# Patient Record
Sex: MALE | Race: WHITE | URBAN - METROPOLITAN AREA
[De-identification: names, ages, dates, MRNs, and addresses within clinical notes are randomized per-mention and may not be internally consistent; named-entity substitution may affect disease eponyms.]

---

## 2022-11-23 ENCOUNTER — OFFICE VISIT (OUTPATIENT)
Dept: ORTHOPEDIC SURGERY | Age: 57
End: 2022-11-23
Payer: OTHER GOVERNMENT

## 2022-11-23 VITALS — BODY MASS INDEX: 36.4 KG/M2 | WEIGHT: 260 LBS | HEIGHT: 71 IN

## 2022-11-23 DIAGNOSIS — S46.011A TRAUMATIC TEAR OF RIGHT ROTATOR CUFF, UNSPECIFIED TEAR EXTENT, INITIAL ENCOUNTER: Primary | ICD-10-CM

## 2022-11-23 PROCEDURE — 99203 OFFICE O/P NEW LOW 30 MIN: CPT | Performed by: ORTHOPAEDIC SURGERY

## 2022-11-23 RX ORDER — TADALAFIL 10 MG/1
10 TABLET ORAL
COMMUNITY
Start: 2022-10-04

## 2022-11-23 RX ORDER — TRAMADOL HYDROCHLORIDE 50 MG/1
50 TABLET ORAL NIGHTLY
Qty: 15 TABLET | Refills: 0 | Status: SHIPPED | OUTPATIENT
Start: 2022-11-23 | End: 2022-12-08

## 2022-11-23 RX ORDER — MOXIFLOXACIN 5 MG/ML
SOLUTION/ DROPS OPHTHALMIC
COMMUNITY
Start: 2022-03-03

## 2022-11-23 RX ORDER — PREDNISOLONE ACETATE 10 MG/ML
SUSPENSION/ DROPS OPHTHALMIC
COMMUNITY
Start: 2022-03-03

## 2022-11-23 RX ORDER — LORATADINE 10 MG/1
10 TABLET ORAL
COMMUNITY
Start: 2022-07-22

## 2022-11-23 RX ORDER — LEVOTHYROXINE SODIUM 0.15 MG/1
150 TABLET ORAL
COMMUNITY
Start: 2022-03-08

## 2022-11-23 RX ORDER — FLUTICASONE PROPIONATE 50 MCG
SPRAY, SUSPENSION (ML) NASAL
COMMUNITY
Start: 2022-06-02

## 2022-11-23 RX ORDER — INSULIN GLARGINE 100 [IU]/ML
INJECTION, SOLUTION SUBCUTANEOUS
COMMUNITY
Start: 2022-02-10

## 2022-11-23 RX ORDER — ATORVASTATIN CALCIUM 80 MG/1
80 TABLET, FILM COATED ORAL
COMMUNITY
Start: 2022-07-22

## 2022-11-23 RX ORDER — ASPIRIN 81 MG/1
81 TABLET ORAL
COMMUNITY
Start: 2022-10-04

## 2022-11-23 RX ORDER — KETOROLAC TROMETHAMINE 5 MG/ML
SOLUTION OPHTHALMIC
COMMUNITY
Start: 2022-03-03

## 2022-11-23 NOTE — PROGRESS NOTES
[facility]  History and Physical  Shoulder Pain    Date:  2022    Name:  Dagmar Salgado  Address:  [address] [address]    :  1965      Age:   59 y.o.    SSN:  [id number]      Medical Record Number:  [id number]    Reason for Visit:    Shoulder Pain (NP RIGHT SHOULDER)      HPI:   Dagmar Salgado is a 59 y.o. male who presents to our office today for evaluation right shoulder pain. The patient sustained a fall approximately 5 weeks ago where he landed on the right shoulder. He has had continued pain since the injury though he admits to slight improvements. The pain is worse with activity and lifting objects overhead. She has taken over-the-counter anti-inflammatories for pain control with mild to minimal relief. Pain Assessment  Location of Pain: Shoulder  Location Modifiers: Right  Severity of Pain: 4  Quality of Pain: Aching, Dull, Sharp  Duration of Pain: Persistent  Frequency of Pain: Constant  Aggravating Factors: Other (Comment), Exercise, Straightening, Stretching  Limiting Behavior: Yes  Relieving Factors: Rest, Other (Comment), Ice  Work-Related Injury: No  Are there other pain locations you wish to document?: No    Review of Systems:  A 14 point review of systems available in the scanned medical record as documented by the patient. The review is negative with the exception of those things mentioned in the History of Present Illness and Past Medical History. Past History:  No past medical history on file. No past surgical history on file.   Current Outpatient Medications on File Prior to Visit   Medication Sig Dispense Refill    tadalafil (CIALIS) 10 MG tablet 10 mg      prednisoLONE acetate (PRED FORTE) 1 % ophthalmic suspension INSTILL 1 DROP TO AFFECTED EYE(S) THREE TIMES A DAY IN SURGERY EYE AFTER SURGERY AND TAPER AS DIRECTED      moxifloxacin (VIGAMOX) 0.5 % ophthalmic solution INSTILL 1 DROP TO AFFECTED EYE(S) THREE TIMES A DAY INTO OPERATIVE EYE 1 DAY BEFORE SURGERY AND 1 DROP THE MORNING OF SURGERY. CONTINUE THREE TIMES A DAY FOR 1 WEEK.      metFORMIN (GLUCOPHAGE) 1000 MG tablet 1,000 mg      loratadine (CLARITIN) 10 MG tablet 10 mg      levothyroxine (SYNTHROID) 150 MCG tablet 150 mcg      ketorolac (ACULAR) 0.5 % ophthalmic solution INSTILL 1 DROP TO AFFECTED EYE(S) ONCE DAILY IN OPERATIVE EYE THE DAY BEFORE SURGERY AND MORNING OF, TAPER AS DIRECTED FOR 4 WEEKS      insulin glargine (LANTUS) 100 UNIT/ML injection vial INJECT 70 UNITS UNDER THE SKIN AT BEDTIME * DO NOT MIX IN SAME SYRINGE WITH OTHER INSULINS * (DISCARD 28 DAYS AFTER OPENING VIAL)      fluticasone (FLONASE) 50 MCG/ACT nasal spray USE 2 SPRAYS IN EACH NOSTRIL ONCE DAILY FOR NASAL ALLERGY **NOTE NEW DOSE/DIRECTIONS**      empagliflozin (JARDIANCE) 25 MG tablet 12.5 mg      dulaglutide (TRULICITY) 1.5 CQ/8.4CO SC injection INJECT 1.5MG (0.5ML) UNDER THE SKIN EVERY WEEK FOR BLOOD SUGAR      atorvastatin (LIPITOR) 80 MG tablet 80 mg      aspirin 81 MG EC tablet 81 mg       No current facility-administered medications on file prior to visit.      Social History     Socioeconomic History    Marital status:      Spouse name: Not on file    Number of children: Not on file    Years of education: Not on file    Highest education level: Not on file   Occupational History    Not on file   Tobacco Use    Smoking status: Never    Smokeless tobacco: Never   Substance and Sexual Activity    Alcohol use: Not Currently    Drug use: Never    Sexual activity: Not on file   Other Topics Concern    Not on file   Social History Narrative    Not on file     Social Determinants of Health     Financial Resource Strain: Not on file   Food Insecurity: Not on file   Transportation Needs: Not on file   Physical Activity: Not on file   Stress: Not on file   Social Connections: Not on file   Intimate Partner Violence: Not on file   Housing Stability: Not on file     No family history on file. Current Medications:    Current Outpatient Medications   Medication Sig Dispense Refill    tadalafil (CIALIS) 10 MG tablet 10 mg      prednisoLONE acetate (PRED FORTE) 1 % ophthalmic suspension INSTILL 1 DROP TO AFFECTED EYE(S) THREE TIMES A DAY IN SURGERY EYE AFTER SURGERY AND TAPER AS DIRECTED      moxifloxacin (VIGAMOX) 0.5 % ophthalmic solution INSTILL 1 DROP TO AFFECTED EYE(S) THREE TIMES A DAY INTO OPERATIVE EYE 1 DAY BEFORE SURGERY AND 1 DROP THE MORNING OF SURGERY. CONTINUE THREE TIMES A DAY FOR 1 WEEK.      metFORMIN (GLUCOPHAGE) 1000 MG tablet 1,000 mg      loratadine (CLARITIN) 10 MG tablet 10 mg      levothyroxine (SYNTHROID) 150 MCG tablet 150 mcg      ketorolac (ACULAR) 0.5 % ophthalmic solution INSTILL 1 DROP TO AFFECTED EYE(S) ONCE DAILY IN OPERATIVE EYE THE DAY BEFORE SURGERY AND MORNING OF, TAPER AS DIRECTED FOR 4 WEEKS      insulin glargine (LANTUS) 100 UNIT/ML injection vial INJECT 70 UNITS UNDER THE SKIN AT BEDTIME * DO NOT MIX IN SAME SYRINGE WITH OTHER INSULINS * (DISCARD 28 DAYS AFTER OPENING VIAL)      fluticasone (FLONASE) 50 MCG/ACT nasal spray USE 2 SPRAYS IN EACH NOSTRIL ONCE DAILY FOR NASAL ALLERGY **NOTE NEW DOSE/DIRECTIONS**      empagliflozin (JARDIANCE) 25 MG tablet 12.5 mg      dulaglutide (TRULICITY) 1.5 XR/8.0JD SC injection INJECT 1.5MG (0.5ML) UNDER THE SKIN EVERY WEEK FOR BLOOD SUGAR      atorvastatin (LIPITOR) 80 MG tablet 80 mg      aspirin 81 MG EC tablet 81 mg       No current facility-administered medications for this visit. Allergies:  No Known Allergies    Physical Exam:  There were no vitals filed for this visit. General: Gabe Ambriz is a healthy and well appearing 59 y.o. male who is sitting comfortably in our office in acute distress. General Exam:   Constitutional: Patient is adequately groomed with no evidence of malnutrition    Neuro: alert.  Oriented X 3  Eyes: Extra-ocular muscles intact  Mouth: Oral mucosa moist. No perioral lesions  Pulm: Respirations unlabored and regular. Right shoulder Exam:  Inspection: No gross deformities, no signs of infection. Palpation:  There is mild subacromial crepitus. Active Range of Motion: Forward elevation of 90, abduction of 90, external rotation with elbow at the side 45, internal rotation to the back pocket    Strength:   External rotation with resistance with elbow at the side 4/5, internal rotation with resistance with elbow at the side 4/5, Champagne toast testing 3/5, Jobes test 3/5    Special Tests: Negative bearhug    Neurovascular: Sensation to light touch is intact, no motor deficits, palpable radial pulses 2+    Laboratory:  No visits with results within 14 Day(s) from this visit. Latest known visit with results is:   No results found for any previous visit. No results found for this or any previous visit (from the past 24 hour(s)). Radiographic:  3 xray views of the right shoulder including True AP in internal and external and axillary lateral were taken in our office today reveal no fractures, dislocations, visible tumors, or signs of acute trauma. Radiographic Impression: Unremarkable right shoulder x-rays      Self assessment questionnaires including ASES and Simple Shoulder Test were completed today. Assessment:  Johan Viveros is a 59 y.o. male suspected right shoulder rotator cuff tear and concomitant developing adhesive capsulitis    Impression:  Encounter Diagnosis   Name Primary? Traumatic tear of right rotator cuff, unspecified tear extent, initial encounter Yes       Office Procedures:  Orders Placed This Encounter   Procedures    MRI SHOULDER RIGHT WO CONTRAST     Standing Status:   Future     Standing Expiration Date:   11/23/2023     Order Specific Question:   Reason for exam:     Answer:   Proscan gali       Plan:      We discussed  with Johan Viveros the we suspect a right shoulder rotator cuff tear and adhesive capsulitis.  Based on his history and clinical exam we will obtain an MRI of his right shoulder to evaluate rotator cuff pathology. Ultram will be provided for symptomatic relief at nighttime only. We will see him back after MRI results obtained. Be Becerra will follow up after MRI results obtained. He was in agreement with this plan and all questions were answered to the patient's satisfaction. He was encouraged to call with any questions. 11/23/2022  5:40 PM      Ranulfo Henriquez D.O. Orthopedic Surgeon, [facility] Fellow    The encounter with the patient was supervised by Dr. Lorrin Bamberger who personally examined the patient and reviewed the plan. This dictation was performed with a verbal recognition program (DRAGON) and it was checked for errors. It is possible that there are still dictated errors within this office note. If so, please bring any errors to my attention for an addendum. All efforts were made to ensure that this office note is accurate.  _____________________  I was physically present and personally supervised the Orthopaedic Sports Medicine Fellow in the evaluation and development of a treatment plan for this patient. I personally interviewed the patient and performed a physical examination. In addition, I discussed the patient's condition and treatment options with them. I have also reviewed and agree with the past medical, family and social history unless otherwise noted. All of the patient's questions were answered. Samer S. Lorrin Bamberger, MD, PhD  11/23/2022

## 2022-11-28 ENCOUNTER — TELEPHONE (OUTPATIENT)
Dept: ORTHOPEDIC SURGERY | Age: 57
End: 2022-11-28

## 2022-11-28 DIAGNOSIS — S46.011A TRAUMATIC TEAR OF RIGHT ROTATOR CUFF, UNSPECIFIED TEAR EXTENT, INITIAL ENCOUNTER: Primary | ICD-10-CM

## 2022-11-28 RX ORDER — TRAMADOL HYDROCHLORIDE 50 MG/1
50 TABLET ORAL EVERY 6 HOURS PRN
Qty: 28 TABLET | Refills: 0 | Status: CANCELLED | OUTPATIENT
Start: 2022-11-28 | End: 2022-12-05

## 2022-11-28 NOTE — TELEPHONE ENCOUNTER
[facility] NEED[facility] RX FOR TRAMADOL. CAN NOT HAVE ANY RESIDENT INFORMATION NEEDS TO BE UNDER DR Nevaeh Wilhelm. 2ND REQUEST.  PLEASE CALL [phone] EXT [phone]

## 2022-11-28 NOTE — TELEPHONE ENCOUNTER
Patient was given phone # for aretha Olivares. He has a f/u appointment on 12/8/22 to review results.

## 2022-11-28 NOTE — TELEPHONE ENCOUNTER
Other PATIENT IS CALLING REQUESTING A CALL BACK. PATIENT IS READY TO SCHEDULE MRI AND SAYS [facility] WILL COVER ANY LOCATION.  Manuel  [phone]

## 2022-11-28 NOTE — TELEPHONE ENCOUNTER
General Question     Subject: PT STATES [facility] NEEDS AN ORDER SENT TO THEM.   [facility] and /or [facility]  Contact Number: [phone]

## 2022-11-29 DIAGNOSIS — S46.011A TRAUMATIC TEAR OF RIGHT ROTATOR CUFF, UNSPECIFIED TEAR EXTENT, INITIAL ENCOUNTER: Primary | ICD-10-CM

## 2022-11-29 RX ORDER — TRAMADOL HYDROCHLORIDE 50 MG/1
50 TABLET ORAL EVERY 6 HOURS PRN
Qty: 28 TABLET | Refills: 0 | Status: SHIPPED | OUTPATIENT
Start: 2022-11-29 | End: 2022-12-06

## 2022-12-05 ENCOUNTER — TELEPHONE (OUTPATIENT)
Dept: ORTHOPEDIC SURGERY | Age: 57
End: 2022-12-05

## 2022-12-08 ENCOUNTER — OFFICE VISIT (OUTPATIENT)
Dept: ORTHOPEDIC SURGERY | Age: 57
End: 2022-12-08

## 2022-12-08 VITALS — WEIGHT: 260 LBS | HEIGHT: 71 IN | BODY MASS INDEX: 36.4 KG/M2

## 2022-12-08 DIAGNOSIS — S46.011D TRAUMATIC COMPLETE TEAR OF RIGHT ROTATOR CUFF, SUBSEQUENT ENCOUNTER: Primary | ICD-10-CM

## 2022-12-08 RX ORDER — MELOXICAM 15 MG/1
15 TABLET ORAL DAILY PRN
Qty: 30 TABLET | Refills: 5 | Status: SHIPPED | OUTPATIENT
Start: 2022-12-08

## 2022-12-08 RX ORDER — METHYLPREDNISOLONE ACETATE 40 MG/ML
80 INJECTION, SUSPENSION INTRA-ARTICULAR; INTRALESIONAL; INTRAMUSCULAR; SOFT TISSUE ONCE
Status: SHIPPED | OUTPATIENT
Start: 2022-12-08

## 2022-12-08 NOTE — LETTER
Shoulder Elbow Rehabilitation Referral    Patient Name: Renetta Paniagua      YOB: 1965    Diagnosis:   1. Traumatic complete tear of right rotator cuff, subsequent encounter    Massive two tendon rotator cuff tear     Precautions: CSI 12/8/2022     Post Op Instructions:  [] Continuous passive motion (CPM)  [] Elbow range of motion  [x] Exercise in plane of scapula   []  Strengthening     [] Pulley and instruction    [x] Home exercise program (copy to patient)   [] Sling when arm at risk  [] Sling or brace at all times   [] AAROM: Forward elevation to             [] AAROM: External rotation to     [] Isometric external rotator strengthening [] AAROM: internal rotation: up the back  [] Isometric abductor strengthening  [] AAROM: Internal abduction     [] Isometric internal rotator strengthening [] AAROM: cross-body adduction             Stretching:     Strengthening:  [x] Four quadrant (FE, ER, IR, CBA)  [x] Rotator cuff (ER, IR, Abd)  [] Forward Elevation    [] External Rotators     [] External Rotation    [] Internal Rotators  [] Internal Rotation: up/back   [] Abductors     [] Internal Rotation: supine in abduction  [] Flexors  [] Cross-body abduction    [] Extensors  [] Pendulum (FE, Abd/Add, cw/ccw)  [x] Scapular Stabilizers   [] Wall-walking (FE, Abd)    [x] Shoulder shrugs     [] Table slides      [x] Rhomboid pinch  [] Elbow (flex, ext, pron, sup)    [] Lat.  Pull downs     [] Medial epicondylitis program    [] Forward punch   [] Lateral epicondylitis program    [] Internal rotators     [x] Progressive resistive exercises  [] Bench Press        [] Bench press plus  Activities:     [] Lateral pull-downs  [x] Rowing     [x] Progressive two-hand supine press  [] Stepper/Exercise bike   [x] Biceps: curls/supination  [] Swimming  [] Water exercises    Modalities: PRN    Return to Sport:  [] Ultrasound     [] Plyometrics  [] Iontophoresis     [] Rhythmic stabilization  [] Moist heat     [] Core strengthening   [] Massage     [] Sports specific program:   [x] Cryotherapy      [] Electrical stimulation     [] Paraffin  [] Whirlpool  [] TENS    [x] Home exercise program (copy to patient). Perform exercises for:   15     minutes    2-3      times/day  [x] Supervised physical therapy  Frequency: []  1x week  [x] 2x week  [] 3x week  [] Other:   Duration: [] 2 weeks   [] 4 weeks  [x] 6 weeks  [] Other:     Additional Instructions:           Samer S. Lorrin Bamberger, MD, PhD

## 2022-12-08 NOTE — PROGRESS NOTES
Chief Complaint    Shoulder Pain (F/U RIGHT SHOULDER)      History of Present Illness:  Mariela Julian is a pleasant, 59 y.o., male, here today for follow up of his right shoulder. To recap, the patient sustained a fall approximately 6 weeks ago where he landed on the right shoulder. He has had continued pain since the injury though he admits to slight improvements. The pain is worse with activity and lifting objects overhead. He has taken over-the-counter anti-inflammatories for pain control with mild to minimal relief. Prior to this injury he was having no problems with this shoulder. His history and physical exam were concerning for a sizeable rotator cuff tear. We did order an MRI to evaluate the rotator cuff. He tolerated the study well and presents today to review those results. He reports no new injuries or setbacks. Pain Assessment  Location of Pain: Shoulder  Location Modifiers: Right  Severity of Pain: 4  Quality of Pain: Sharp, Dull, Aching  Duration of Pain: Persistent  Frequency of Pain: Constant  Aggravating Factors: Other (Comment), Straightening, Exercise, Stretching  Limiting Behavior: Yes  Relieving Factors: Rest, Other (Comment)  Work-Related Injury: No  Are there other pain locations you wish to document?: No      Medical History:  Patient's medications, allergies, past medical, surgical, social and family histories were reviewed and updated as appropriate. No notes on file    Review of Systems  A 14 point review of systems was completed by the patient and is available in the media section of the scanned medical record and was reviewed on 12/8/2022. The review is negative with the exception of those things mentioned in the HPI and Past Medical History    Vital Signs: There were no vitals filed for this visit. General/Appearance: Alert and oriented and in no apparent distress. Skin:  There are no skin lesions, cellulitis, or extreme edema.  The patient has warm and well-perfused Bilateral upper extremities with brisk capillary refill. Right Shoulder Exam:  Inspection: No gross deformities, no signs of infection. Palpation: Tenderness over the rotator cuff footprint    Active Range of Motion: Forward Elevation 90 self limited, External Rotation 45    Passive Range of Motion: Forward Elevation 150 with pain on arm descent    Strength:  External Rotation 4/5, Internal Rotation 4/5    Special Tests: Negative drop arm, Negative ER lag, Negative belly press, Positive bear hug with 3/5 strength, No Master muscle deformity. Neurovascular: Sensation to light touch is intact, no motor deficits, palpable radial pulses 2+      Radiology:     No new XR obtained at this time. MRI Right shoulder: 12/7/22  CONCLUSION:   1. Massive rotator cuff tear with complete supraspinatus and near complete infraspinatus tears    with few posterior infraspinatus tendon fibers intact. Medial retraction up to 4cm. 2. Moderate supraspinatus, infraspinatus and teres minor muscle fatty atrophy with grade 1    strains of the supraspinatus and infraspinatus. 3. Proximal biceps tendon rupture with distal retraction. 4. Grade 1 AC joint injury superimposed on moderate AC joint osteoarthritis. Correlate with    radiograph to evaluate for distal clavicle fracture. 5. High-grade IGHL sprain. 6. Small joint effusion with bodies in the subcoracoid recess, presumably blood. Assessment :  Mr. Pito Mustafa is a pleasant, 59 y.o. patient who unfortunately does have a massive two tendon rotator cuff tear as demonstrated on MRI. He has several options moving forward. Impression:  Encounter Diagnosis   Name Primary?     Traumatic complete tear of right rotator cuff, subsequent encounter Yes       Office Procedures:  Orders Placed This Encounter   Procedures    Amb External Referral To Physical Therapy     Referral Priority:   Routine     Referral Type:   Consult for Advice and Opinion Referral Reason:   Patient Preference     Requested Specialty:   Orthopedic Physical Therapist     Number of Visits Requested:   1    [id number] - MA DRAIN/INJECT LARGE JOINT/BURSA       After discussing the risks and benefits of a corticosteroid injection, Caroline Martines did state an understanding and gave verbal consent to proceed. After cleansing the injection site with Chlora-prep and using aseptic techniques,  2 CC of Depo Medrol 40mg/ml and 8 CC of 1% lidocaine were injected in the right subacromial space  He  tolerated the procedure well with no immediate adverse sequelae after the injection. A bandage was placed over the injection site. Appropriate post injections instructions were given to the patient. Treatment Plan: We had a candid discussion with the patient today regarding his shoulder. We reviewed pertinent imaging today with the patient. We discussed diagnosis and treatment options in detail. Unfortunately the MRI demonstrates a massive two tendon cuff tear. Given the atrophy and retraction of the tendons on MRI we are not confident this will be reparable and we do not feel he is a good candidate for an arthroscopic repair. There is no atrophy of the subscapularis tendon. We discussed conservative vs surgical treatment options in detail. Surgically he may be a candidate for an SCR. We discussed in detail risks, benefits and expectations postoperatively. We also discussed the lengthy recovery associated with this operation. Given his physically demanding job and the time he would need to remain off from work, we do not feel he is a candidate for this operation at this time. Conservatively we have the option to proceed with a steroid injection today to manage his pain and couple this with formal physical therapy to work on both motion and strength. We explained this would not address the structural problem but may buy some time before he will require an operation.  Today he would like to proceed with conservative treatment. We did go ahead and proceed with an injection today. Detailed therapy orders were placed in his chart. He would like to do this at [facility] in [place], [place]. We would also like for him to begin an oral anti-inflammatory. We will call Meloxicam in to his pharmacy. He is agreeable to this plan. We will see Eun Herrera back in 6-8 weeks and/or as needed. All questions were answered to patient's satisfaction and He was encouraged to call with any further questions or concerns. Xavier Kenyon is in agreement with this plan. Risks, benefits and potential complications of arthroscopic shoulder surgery were discussed with the patient. Risks discussed include but are not limited to bleeding, infection, anesthetic risk, injury to nerves and blood vessels, deep vein thrombosis, residual stiffness and weakness, and the need for revision surgery. The patient also understands that anesthetic risks include cardiopulmonary issues, drug reactions and even death. The patient voices an understanding of the importance of physical therapy and home exercises after surgery. All questions were answered and written informed consent for surgery was obtained today. Greater than 25 minutes were expended on various aspects of today's visit. 12/8/2022  5:48 PM    Jaspal Hedrick ATC  [address] [facility]    During this examination, ITomi, functioned as a scribe for Dr. Juni Love. The history taking and physical examination were performed by Dr. Merna Arciniega. All counseling during the appointment was performed between the patient and Dr. Merna Arciniega. 12/8/22  ______________  I, Dr. Juni Love, personally performed the services described in this documentation as described by Jaspal Hedrick ATC in my presence, and it is both accurate and complete. Evelio Arciniega MD, PhD  12/8/2022

## 2022-12-12 RX ORDER — METHYLPREDNISOLONE ACETATE 40 MG/ML
80 INJECTION, SUSPENSION INTRA-ARTICULAR; INTRALESIONAL; INTRAMUSCULAR; SOFT TISSUE ONCE
Status: COMPLETED | OUTPATIENT
Start: 2022-12-12 | End: 2022-12-12

## 2022-12-12 RX ADMIN — METHYLPREDNISOLONE ACETATE 80 MG: 40 INJECTION, SUSPENSION INTRA-ARTICULAR; INTRALESIONAL; INTRAMUSCULAR; SOFT TISSUE at 15:13

## 2023-01-05 ENCOUNTER — TELEPHONE (OUTPATIENT)
Dept: ORTHOPEDIC SURGERY | Age: 58
End: 2023-01-05

## 2023-01-05 NOTE — TELEPHONE ENCOUNTER
General Question     Subject: PATIENT REQUESTING A CALL REGARDING TO HOW LONG HE SHOULD DO PT.   Patient: Ileana Godoy Number: [phone]

## 2023-01-26 ENCOUNTER — OFFICE VISIT (OUTPATIENT)
Dept: ORTHOPEDIC SURGERY | Age: 58
End: 2023-01-26

## 2023-01-26 VITALS — WEIGHT: 260 LBS | BODY MASS INDEX: 36.4 KG/M2 | HEIGHT: 71 IN

## 2023-01-26 DIAGNOSIS — M25.511 RIGHT SHOULDER PAIN, UNSPECIFIED CHRONICITY: ICD-10-CM

## 2023-01-26 DIAGNOSIS — S46.011A TRAUMATIC TEAR OF RIGHT ROTATOR CUFF, UNSPECIFIED TEAR EXTENT, INITIAL ENCOUNTER: ICD-10-CM

## 2023-01-26 DIAGNOSIS — S46.011D TRAUMATIC COMPLETE TEAR OF RIGHT ROTATOR CUFF, SUBSEQUENT ENCOUNTER: Primary | ICD-10-CM

## 2023-01-26 NOTE — PROGRESS NOTES
Chief Complaint    Shoulder Pain (F/U RIGHT SHOULDER)      History of Present Illness:  Jeff Coppola is a pleasant, 59 y.o., male, here today for follow up of his right shoulder. The patient has a history of unfortunately sustaining a fall in late October, 2022 onto the right shoulder. His MRI showed a massive irreparable rotator cuff tear. He was given a corticosteroid injection and he felt that it helped for a short period of time. He has had continued pain and has weaknessfd since the injury though he admits to slight improvements. The pain is worse with activity and lifting objects overhead. He reports no new injuries or setbacks. Pain Assessment  Location of Pain: Shoulder  Location Modifiers: Right  Severity of Pain: 5  Quality of Pain: Aching, Sharp  Duration of Pain: Persistent  Frequency of Pain: Constant  Aggravating Factors: Other (Comment), Straightening, Stretching, Exercise  Limiting Behavior: Yes  Relieving Factors: Rest, Other (Comment), Ice  Work-Related Injury: No  Are there other pain locations you wish to document?: No      Medical History:  Patient's medications, allergies, past medical, surgical, social and family histories were reviewed and updated as appropriate. Review of Systems  A 14 point review of systems was completed by the patient and is available in the media section of the scanned medical record and was reviewed on 1/26/2023. The review is negative with the exception of those things mentioned in the HPI and Past Medical History    Vital Signs: There were no vitals filed for this visit. General/Appearance: Alert and oriented and in no apparent distress. Skin:  There are no skin lesions, cellulitis, or extreme edema. The patient has warm and well-perfused Bilateral upper extremities with brisk capillary refill. Right Shoulder Exam:  Inspection: No gross deformities, no signs of infection.     Palpation: Tenderness over the rotator cuff footprint    Active Range of Motion: Forward Elevation 90 self limited due to pain but can go to 130, Abduction to 120, External Rotation 45    Passive Range of Motion: Forward Elevation 140 with painful arch and pain on arm descent    Strength:  External Rotation 4/5, Internal Rotation 4/5    Special Tests: Negative drop arm, Negative ER lag, Negative belly press, Positive bear hug with 3/5 strength, No Master muscle deformity. Neurovascular: Sensation to light touch is intact, no motor deficits, palpable radial pulses 2+      Radiology:     No new XR obtained at this time. MRI Right shoulder: 12/7/22  CONCLUSION:   1. Massive rotator cuff tear with complete supraspinatus and near complete infraspinatus tears    with few posterior infraspinatus tendon fibers intact. Medial retraction up to 4cm. 2. Moderate supraspinatus, infraspinatus and teres minor muscle fatty atrophy with grade 1    strains of the supraspinatus and infraspinatus. 3. Proximal biceps tendon rupture with distal retraction. 4. Grade 1 AC joint injury superimposed on moderate AC joint osteoarthritis. Correlate with    radiograph to evaluate for distal clavicle fracture. 5. High-grade IGHL sprain. 6. Small joint effusion with bodies in the subcoracoid recess, presumably blood. Assessment :  Mr. Dominga Ugarte is a pleasant, 59 y.o. patient who unfortunately does have a massive two tendon rotator cuff tear as demonstrated on MRI that is irreparable and now has failed conservative management. His next best options would be to consider surgery. Impression:  Encounter Diagnoses   Name Primary?     Traumatic complete tear of right rotator cuff, subsequent encounter Yes    Traumatic tear of right rotator cuff, unspecified tear extent, initial encounter     Right shoulder pain, unspecified chronicity        Office Procedures:  Orders Placed This Encounter   Procedures    DJO ultrasling IV Shoulder Sling     Patient was prescribed a [employer] Ultrasling IV Shoulder Brace. The right shoulder will require stabilization / immobilization from this orthosis. The orthosis will assist in protecting the affected area, provide functional support and facilitate healing. The device was ordered and fit on 1/26/23. The patient was educated and fit by a healthcare professional with expert knowledge and specialization in brace application while under the direct supervision of the treating physician. Verbal and written instructions for the use of and application of this item were provided. They were instructed to contact the office immediately should the brace result in increased pain, decreased sensation, increased swelling or worsening of the condition. Treatment Plan: We had a candid discussion with the patient today regarding his shoulder. We reviewed pertinent imaging today with the patient. We discussed diagnosis and treatment options in detail. Unfortunately the MRI demonstrates a massive two tendon cuff tear. Given the atrophy and retraction of the tendons on MRI we are not confident this will be reparable and we do not feel he is a good candidate for an arthroscopic repair. There is no atrophy of the subscapularis tendon. We discussed conservative vs surgical treatment options in detail. Surgically he may be a candidate for an SCR vs a balloon arthroplasty. We discussed both surgeries in detail along risks, benefits and expectations postoperatively. We also discussed the lengthy recovery associated with this operation. Given his physically demanding job and the time he would need to remain off from work, we do not feel he is a candidate for tSCR his operati at this time. The patient has opted to go with the insertion of the subacromial balloon. We discussed the timeline to recovery and the importance of physical therapy. He was fitted with a sling today.     Risks, benefits and potential complications of arthroscopic shoulder surgery were discussed with the patient. Risks discussed include but are not limited to bleeding, infection, anesthetic risk, injury to nerves and blood vessels, deep vein thrombosis, residual stiffness and weakness, and the need for revision surgery. The patient also understands that anesthetic risks include cardiopulmonary issues, drug reactions and even death. The patient voices an understanding of the importance of physical therapy and home exercises after surgery. All questions were answered and written informed consent for surgery was obtained today. We will see Avery Fuller back postop and/or as needed. All questions were answered to patient's satisfaction and He was encouraged to call with any further questions or concerns. Nimco Morales is in agreement with this plan. 1/26/2023  5:40 PM    Chung Eagle PA-C  Orthopaedic Sports Medicine Physician Assistant    During this examination, I, Chung Eagle PA-C, functioned as a scribe for Dr. Sam Fernandez. This dictation was performed with a verbal recognition program (DRAGON) and it was checked for errors. It is possible that there are still dictated errors within this office note. If so, please bring any errors to my attention for an addendum. All efforts were made to ensure that this office note is accurate.  ___________________  I, Dr. Sam Fernandez, personally performed the services described in this documentation as described by Chung Eagle PA-C in my presence, and it is both accurate and complete. Evelio Wong MD, PhD  1/26/2023

## 2023-01-31 ENCOUNTER — TELEPHONE (OUTPATIENT)
Dept: ORTHOPEDIC SURGERY | Age: 58
End: 2023-01-31

## 2023-01-31 NOTE — TELEPHONE ENCOUNTER
Auth: # [id number]    Date: 01/23/23 thru 05/22/23  Type of SX:  Outpatient  Location: [facility]  CPT: 46155, [id number]   DX Code: C71.834F, T23.499N, M25.511  SX area: Rt shoulder  Insurance: [facility]

## 2023-02-02 NOTE — PROGRESS NOTES
No H&P found in Epic or CE. No PCP listed. LM FOR PATIENT AND WIFE TO CALL BACK WITH PRE-OP H&P  NITHIN/  ALEX (@ SURGEON'S) ALSO WILL TRY TO FIND OUT WHERE / WHEN H&P WAS DONE AND HAVE FAXED TO [facility]AM   /URIEL    2/3 Patient called back. WENT [facility] ON 2/3. TESTING DONE AT [address] [place] [address]   ([phone] EXT [id number] SAW [address] [facility] PCP)  H&P IN MEDIA.   PATIENT HAS EKG AND WILL BRING Donivan Bence Derk Jester

## 2023-02-02 NOTE — PROGRESS NOTES
PRE-OP INSTRUCTIONS FOR SURGICAL PATIENTS          Our Pre-admission Testing Nurses tried and were unable to reach you today. Please read the attached instructions if you did not listen to your voicemail. Follow all instructions provided to you from your surgeon's office, including your ARRIVAL TIME. Arrange for someone to drive you home and be with you for the first 24 hours after discharge. NOTE: at this time ONLY 2 ADULTS may accompany you   One person encouraged to stay at hospital entire time if outpatient surgery    Enter the MAIN entrance located on [address] and report to the surgical desk on the LEFT side of the lobby. Please park in the parking garage or there is free [place] available after 7am for your use. Bring your insurance card & photo ID with you to register. Bring your medication list with you with dose and frequency listed (including over the counter medications)  Contact your ordering physician/surgeon for medication instructions as soon as possible, especially if taking blood thinners, aspirin, heart, or diabetic medication. HOLD  MOBIC/ MELOXICAM AND ALL ANTI-INFLAMMATORIES UNTIL AFTER SURGERY   A Pre-Surgical History and Physical MUST be completed WITHIN 30 DAYS OR LESS prior to your procedure by your Physician or an Urgent Care. DO NOT EAT ANYTHING 8 hours prior to arrival for surgery. You may have sips of WATER ONLY (up to 8 ounces) 4 hours prior to your arrival for surgery. Then nothing further 4 hours prior to arriving at hospital.   No gum, candy, mints, or ice chips day of procedure. Please refrain from drinking alcohol the day before or day of your procedure. Do not use any recreational marijuana at least 24 hours or street drugs (heroin, cocaine) at minimum 5 days prior to your procedure. Please do not smoke the day of your procedure. Dress in loose, comfortable clothing appropriate for redressing after your procedure.    Do not wear jewelry (including body piercings), make-up, fingernail polish, lotion, powders, or metal hairclips. Contacts, glasses, dentures, and hearing aids will need to be removed prior to surgery. Bring your case(s) to protect them while you are in surgery. If you use a CPAP, please bring it with you on the day of your procedure. Do not shave or wax for 72 hours prior to procedure near your operative site. Leave all other valuables at home. IF there is a change in your physical condition for some reason, such as: a cold, fever, rash, cuts, sores, or any other infection, especially near your surgical site, contact your surgeon's office immediately.   PLEASE CALL [phone] TO NOTIFY PAT NURSE OF WHERE WHEN PRE-OP H&P WAS/ WILL BE DONE     Instructions - EMAILED AND LM ON PT VM   Neli Handy RN.2/2/2023 .3:37 PM

## 2023-02-02 NOTE — PROGRESS NOTES
Place patient label inside box (if no patient label, complete below)  Name:  :  MR#:   [facility] / PROCEDURE  I (we), Darwin Javier (Patient Name) authorize Rony Curran MD (Provider / Radha Parra) and/or such assistants as may be selected by him/her, to perform the following operation/procedure(s): RIGHT SHOULDER ARTHROSCOPY, INSERTION OF SUBACROMIAL BALLOON SPACER, DEBRIDEMENT        Note: If unable to obtain consent prior to an emergent procedure, document the emergent reason in the medical record. This procedure has been explained to my (our) satisfaction and included in the explanation was: The intended benefit, nature, and extent of the procedure to be performed; The significant risks involved and the probability of success; Alternative procedures and methods of treatment; The dangers and probable consequences of such alternatives (including no procedure or treatment); The expected consequences of the procedure on my future health; Whether other qualified individuals would be performing important surgical tasks and/or whether  would be present to advise or support the procedure. I (we) understand that there are other risks of infection and other serious complications in the pre-operative/procedural and postoperative/procedural stages of my (our) care. I (we) have asked all of the questions which I (we) thought were important in deciding whether or not to undergo treatment or diagnosis. These questions have been answered to my (our) satisfaction. I (we) understand that no assurance can be given that the procedure will be a success, and no guarantee or warranty of success has been given to me (us).     It has been explained to me (us) that during the course of the operation/procedure, unforeseen conditions may be revealed that necessitate extension of the original procedure(s) or different procedure(s) than those set forth in Paragraph 1. I (we) authorize and request that the above-named physician, his/her assistants or his/her designees, perform procedures as necessary and desirable if deemed to be in my (our) best interest.     Revised 8/2/2021                                                                          Page 1 of 2       I acknowledge that health care personnel may be observing this procedure for the purpose of medical education or other specified purposes as may be necessary as requested and/or approved by my (our) physician. I (we) consent to the disposal by the hospital Pathologist of the removed tissue, parts or organs in accordance with hospital policy. I do ____ do not ____ consent to the use of a local infiltration pain blocking agent that will be used by my provider/surgical provider to help alleviate pain during my procedure. I do ____ do not ____ consent to an emergent blood transfusion in the case of a life-threatening situation that requires blood components to be administered. This consent is valid for 24 hours from the beginning of the procedure. This patient does ____ or does not ____ currently have a DNR status/order. If DNR order is in place, obtain Addendum to the Surgical Consent for ALL Patients with a DNR Order to address minerva-operative status for limited intervention or DNR suspension.      I have read and fully understand the above Consent for Operation/Procedure and that all blanks were completed before I signed the consent.   _____________________________       _____________________      ____/____am/pm  Signature of Patient or legal representative      Printed Name / Relationship            Date / Time   ____________________________       _____________________      ____/____am/pm  Witness to Signature                                    Printed Name                    Date / Time    If patient is unable to sign or is a minor, complete the following)  Patient is a minor, ____ years of age, or unable to sign because:   ______________________________________________________________________________________________    If a phone consent is obtained, consent will be documented by using two health care professionals, each affirming that the consenting party has no questions and gives consent for the procedure discussed with the physician/provider.   _____________________          ____________________       _____/_____am/pm   2nd witness to phone consent        Printed name           Date / Time    Informed Consent:  I have provided the explanation described above in section 1 to the patient and/or legal representative.  I have provided the patient and/or legal representative with an opportunity to ask any questions about the proposed operation/procedure.   ___________________________          ____________________         ____/____am/pm  Provider / Proceduralist                            Printed name            Date / Time  Revised 8/2/2021                                                                      Page 2 of 2

## 2023-02-03 ENCOUNTER — TELEPHONE (OUTPATIENT)
Dept: ORTHOPEDIC SURGERY | Age: 58
End: 2023-02-03

## 2023-02-03 NOTE — TELEPHONE ENCOUNTER
Other PATIENT IS CALLING REQUESTING SURGERY TIME.  [facility] [place] [address] [phone]

## 2023-02-09 ENCOUNTER — ANESTHESIA EVENT (OUTPATIENT)
Dept: OPERATING ROOM | Age: 58
End: 2023-02-09
Payer: OTHER GOVERNMENT

## 2023-02-10 ENCOUNTER — HOSPITAL ENCOUNTER (OUTPATIENT)
Age: 58
Setting detail: OUTPATIENT SURGERY
Discharge: HOME | End: 2023-02-10
Attending: ORTHOPAEDIC SURGERY | Admitting: ORTHOPAEDIC SURGERY
Payer: OTHER GOVERNMENT

## 2023-02-10 ENCOUNTER — ANESTHESIA (OUTPATIENT)
Dept: OPERATING ROOM | Age: 58
End: 2023-02-10
Payer: OTHER GOVERNMENT

## 2023-02-10 VITALS
SYSTOLIC BLOOD PRESSURE: 123 MMHG | TEMPERATURE: 97 F | BODY MASS INDEX: 35.87 KG/M2 | DIASTOLIC BLOOD PRESSURE: 80 MMHG | HEART RATE: 78 BPM | HEIGHT: 71 IN | WEIGHT: 256.2 LBS | RESPIRATION RATE: 16 BRPM | OXYGEN SATURATION: 91 %

## 2023-02-10 DIAGNOSIS — Z98.890 S/P SHOULDER SURGERY: Primary | ICD-10-CM

## 2023-02-10 LAB
GLUCOSE BLD-MCNC: 119 MG/DL (ref 70–99)
GLUCOSE BLD-MCNC: 168 MG/DL (ref 70–99)
PERFORMED ON: ABNORMAL
PERFORMED ON: ABNORMAL

## 2023-02-10 PROCEDURE — 3700000000 HC ANESTHESIA ATTENDED CARE: Performed by: ORTHOPAEDIC SURGERY

## 2023-02-10 PROCEDURE — 2709999900 HC NON-CHARGEABLE SUPPLY: Performed by: ORTHOPAEDIC SURGERY

## 2023-02-10 PROCEDURE — 3700000001 HC ADD 15 MINUTES (ANESTHESIA): Performed by: ORTHOPAEDIC SURGERY

## 2023-02-10 PROCEDURE — 2580000003 HC RX 258: Performed by: ANESTHESIOLOGY

## 2023-02-10 PROCEDURE — 6360000002 HC RX W HCPCS: Performed by: ORTHOPAEDIC SURGERY

## 2023-02-10 PROCEDURE — 2580000003 HC RX 258: Performed by: ORTHOPAEDIC SURGERY

## 2023-02-10 PROCEDURE — 7100000001 HC PACU RECOVERY - ADDTL 15 MIN: Performed by: ORTHOPAEDIC SURGERY

## 2023-02-10 PROCEDURE — 2720000010 HC SURG SUPPLY STERILE: Performed by: ORTHOPAEDIC SURGERY

## 2023-02-10 PROCEDURE — 7100000010 HC PHASE II RECOVERY - FIRST 15 MIN: Performed by: ORTHOPAEDIC SURGERY

## 2023-02-10 PROCEDURE — 7100000011 HC PHASE II RECOVERY - ADDTL 15 MIN: Performed by: ORTHOPAEDIC SURGERY

## 2023-02-10 PROCEDURE — 2500000003 HC RX 250 WO HCPCS: Performed by: ANESTHESIOLOGY

## 2023-02-10 PROCEDURE — 3600000004 HC SURGERY LEVEL 4 BASE: Performed by: ORTHOPAEDIC SURGERY

## 2023-02-10 PROCEDURE — 6360000002 HC RX W HCPCS: Performed by: ANESTHESIOLOGY

## 2023-02-10 PROCEDURE — C9290 INJ, BUPIVACAINE LIPOSOME: HCPCS | Performed by: ANESTHESIOLOGY

## 2023-02-10 PROCEDURE — C1713 ANCHOR/SCREW BN/BN,TIS/BN: HCPCS | Performed by: ORTHOPAEDIC SURGERY

## 2023-02-10 PROCEDURE — 2500000003 HC RX 250 WO HCPCS: Performed by: NURSE ANESTHETIST, CERTIFIED REGISTERED

## 2023-02-10 PROCEDURE — 64415 NJX AA&/STRD BRCH PLXS IMG: CPT | Performed by: ANESTHESIOLOGY

## 2023-02-10 PROCEDURE — 6360000002 HC RX W HCPCS: Performed by: NURSE ANESTHETIST, CERTIFIED REGISTERED

## 2023-02-10 PROCEDURE — 7100000000 HC PACU RECOVERY - FIRST 15 MIN: Performed by: ORTHOPAEDIC SURGERY

## 2023-02-10 PROCEDURE — 3600000014 HC SURGERY LEVEL 4 ADDTL 15MIN: Performed by: ORTHOPAEDIC SURGERY

## 2023-02-10 DEVICE — SPACER SHLDR SUBACROMIAL LG BIODEGRADABLE INSPACE: Type: IMPLANTABLE DEVICE | Site: SHOULDER | Status: FUNCTIONAL

## 2023-02-10 RX ORDER — ONDANSETRON 2 MG/ML
4 INJECTION INTRAMUSCULAR; INTRAVENOUS
Status: DISCONTINUED | OUTPATIENT
Start: 2023-02-10 | End: 2023-02-10 | Stop reason: HOSPADM

## 2023-02-10 RX ORDER — MIDAZOLAM HYDROCHLORIDE 1 MG/ML
INJECTION INTRAMUSCULAR; INTRAVENOUS
Status: COMPLETED
Start: 2023-02-10 | End: 2023-02-10

## 2023-02-10 RX ORDER — FENTANYL CITRATE 50 UG/ML
25 INJECTION, SOLUTION INTRAMUSCULAR; INTRAVENOUS EVERY 5 MIN PRN
Status: DISCONTINUED | OUTPATIENT
Start: 2023-02-10 | End: 2023-02-10 | Stop reason: HOSPADM

## 2023-02-10 RX ORDER — SODIUM CHLORIDE 0.9 % (FLUSH) 0.9 %
5-40 SYRINGE (ML) INJECTION PRN
Status: DISCONTINUED | OUTPATIENT
Start: 2023-02-10 | End: 2023-02-10 | Stop reason: HOSPADM

## 2023-02-10 RX ORDER — ONDANSETRON 2 MG/ML
INJECTION INTRAMUSCULAR; INTRAVENOUS PRN
Status: DISCONTINUED | OUTPATIENT
Start: 2023-02-10 | End: 2023-02-10 | Stop reason: SDUPTHER

## 2023-02-10 RX ORDER — FENTANYL CITRATE 50 UG/ML
INJECTION, SOLUTION INTRAMUSCULAR; INTRAVENOUS
Status: COMPLETED
Start: 2023-02-10 | End: 2023-02-10

## 2023-02-10 RX ORDER — SODIUM CHLORIDE 9 MG/ML
INJECTION, SOLUTION INTRAVENOUS PRN
Status: DISCONTINUED | OUTPATIENT
Start: 2023-02-10 | End: 2023-02-10 | Stop reason: HOSPADM

## 2023-02-10 RX ORDER — BUPIVACAINE HYDROCHLORIDE 5 MG/ML
INJECTION, SOLUTION EPIDURAL; INTRACAUDAL PRN
Status: DISCONTINUED | OUTPATIENT
Start: 2023-02-10 | End: 2023-02-10 | Stop reason: SDUPTHER

## 2023-02-10 RX ORDER — SODIUM CHLORIDE, SODIUM LACTATE, POTASSIUM CHLORIDE, CALCIUM CHLORIDE 600; 310; 30; 20 MG/100ML; MG/100ML; MG/100ML; MG/100ML
INJECTION, SOLUTION INTRAVENOUS CONTINUOUS
Status: DISCONTINUED | OUTPATIENT
Start: 2023-02-10 | End: 2023-02-10 | Stop reason: HOSPADM

## 2023-02-10 RX ORDER — PHENYLEPHRINE HYDROCHLORIDE 10 MG/ML
INJECTION INTRAVENOUS PRN
Status: DISCONTINUED | OUTPATIENT
Start: 2023-02-10 | End: 2023-02-10 | Stop reason: SDUPTHER

## 2023-02-10 RX ORDER — SENNA PLUS 8.6 MG/1
1 TABLET ORAL 2 TIMES DAILY PRN
Qty: 20 TABLET | Refills: 0 | Status: SHIPPED | OUTPATIENT
Start: 2023-02-10 | End: 2023-02-24

## 2023-02-10 RX ORDER — FENTANYL CITRATE 50 UG/ML
INJECTION, SOLUTION INTRAMUSCULAR; INTRAVENOUS PRN
Status: DISCONTINUED | OUTPATIENT
Start: 2023-02-10 | End: 2023-02-10 | Stop reason: SDUPTHER

## 2023-02-10 RX ORDER — ROCURONIUM BROMIDE 10 MG/ML
INJECTION, SOLUTION INTRAVENOUS PRN
Status: DISCONTINUED | OUTPATIENT
Start: 2023-02-10 | End: 2023-02-10 | Stop reason: SDUPTHER

## 2023-02-10 RX ORDER — OXYCODONE HYDROCHLORIDE 5 MG/1
5 TABLET ORAL PRN
Status: DISCONTINUED | OUTPATIENT
Start: 2023-02-10 | End: 2023-02-10 | Stop reason: HOSPADM

## 2023-02-10 RX ORDER — LABETALOL HYDROCHLORIDE 5 MG/ML
10 INJECTION, SOLUTION INTRAVENOUS
Status: DISCONTINUED | OUTPATIENT
Start: 2023-02-10 | End: 2023-02-10 | Stop reason: HOSPADM

## 2023-02-10 RX ORDER — HYDRALAZINE HYDROCHLORIDE 20 MG/ML
10 INJECTION INTRAMUSCULAR; INTRAVENOUS
Status: DISCONTINUED | OUTPATIENT
Start: 2023-02-10 | End: 2023-02-10 | Stop reason: HOSPADM

## 2023-02-10 RX ORDER — PROCHLORPERAZINE EDISYLATE 5 MG/ML
5 INJECTION INTRAMUSCULAR; INTRAVENOUS
Status: DISCONTINUED | OUTPATIENT
Start: 2023-02-10 | End: 2023-02-10 | Stop reason: HOSPADM

## 2023-02-10 RX ORDER — ASPIRIN 81 MG/1
81 TABLET ORAL 2 TIMES DAILY
Qty: 30 TABLET | Refills: 5 | Status: SHIPPED | OUTPATIENT
Start: 2023-02-10

## 2023-02-10 RX ORDER — OXYCODONE HYDROCHLORIDE 5 MG/1
10 TABLET ORAL PRN
Status: DISCONTINUED | OUTPATIENT
Start: 2023-02-10 | End: 2023-02-10 | Stop reason: HOSPADM

## 2023-02-10 RX ORDER — BUPIVACAINE HYDROCHLORIDE 5 MG/ML
INJECTION, SOLUTION EPIDURAL; INTRACAUDAL
Status: COMPLETED
Start: 2023-02-10 | End: 2023-02-10

## 2023-02-10 RX ORDER — LIDOCAINE HYDROCHLORIDE 20 MG/ML
INJECTION, SOLUTION INTRAVENOUS PRN
Status: DISCONTINUED | OUTPATIENT
Start: 2023-02-10 | End: 2023-02-10 | Stop reason: SDUPTHER

## 2023-02-10 RX ORDER — ONDANSETRON 4 MG/1
4 TABLET, FILM COATED ORAL EVERY 8 HOURS PRN
Qty: 15 TABLET | Refills: 0 | Status: SHIPPED | OUTPATIENT
Start: 2023-02-10

## 2023-02-10 RX ORDER — SODIUM CHLORIDE 0.9 % (FLUSH) 0.9 %
5-40 SYRINGE (ML) INJECTION EVERY 12 HOURS SCHEDULED
Status: DISCONTINUED | OUTPATIENT
Start: 2023-02-10 | End: 2023-02-10 | Stop reason: HOSPADM

## 2023-02-10 RX ORDER — PROPOFOL 10 MG/ML
INJECTION, EMULSION INTRAVENOUS PRN
Status: DISCONTINUED | OUTPATIENT
Start: 2023-02-10 | End: 2023-02-10 | Stop reason: SDUPTHER

## 2023-02-10 RX ORDER — OXYCODONE HYDROCHLORIDE AND ACETAMINOPHEN 5; 325 MG/1; MG/1
1 TABLET ORAL EVERY 6 HOURS PRN
Qty: 28 TABLET | Refills: 0 | Status: SHIPPED | OUTPATIENT
Start: 2023-02-10 | End: 2023-02-17

## 2023-02-10 RX ORDER — DOXYCYCLINE HYCLATE 100 MG
100 TABLET ORAL 2 TIMES DAILY
Qty: 10 TABLET | Refills: 0 | Status: SHIPPED | OUTPATIENT
Start: 2023-02-10 | End: 2023-02-15

## 2023-02-10 RX ORDER — MIDAZOLAM HYDROCHLORIDE 1 MG/ML
INJECTION INTRAMUSCULAR; INTRAVENOUS PRN
Status: DISCONTINUED | OUTPATIENT
Start: 2023-02-10 | End: 2023-02-10 | Stop reason: SDUPTHER

## 2023-02-10 RX ORDER — LIDOCAINE HYDROCHLORIDE 10 MG/ML
1 INJECTION, SOLUTION EPIDURAL; INFILTRATION; INTRACAUDAL; PERINEURAL
Status: DISCONTINUED | OUTPATIENT
Start: 2023-02-10 | End: 2023-02-10 | Stop reason: HOSPADM

## 2023-02-10 RX ADMIN — PHENYLEPHRINE HYDROCHLORIDE 200 MCG: 10 INJECTION INTRAVENOUS at 11:09

## 2023-02-10 RX ADMIN — PHENYLEPHRINE HYDROCHLORIDE 100 MCG: 10 INJECTION INTRAVENOUS at 12:05

## 2023-02-10 RX ADMIN — PROPOFOL 150 MG: 10 INJECTION, EMULSION INTRAVENOUS at 10:55

## 2023-02-10 RX ADMIN — PHENYLEPHRINE HYDROCHLORIDE 200 MCG: 10 INJECTION INTRAVENOUS at 11:18

## 2023-02-10 RX ADMIN — FENTANYL CITRATE 100 MCG: 50 INJECTION, SOLUTION INTRAMUSCULAR; INTRAVENOUS at 09:00

## 2023-02-10 RX ADMIN — ROCURONIUM BROMIDE 5 MG: 10 INJECTION INTRAVENOUS at 11:31

## 2023-02-10 RX ADMIN — PHENYLEPHRINE HYDROCHLORIDE 100 MCG: 10 INJECTION INTRAVENOUS at 12:19

## 2023-02-10 RX ADMIN — ONDANSETRON 4 MG: 2 INJECTION INTRAMUSCULAR; INTRAVENOUS at 12:02

## 2023-02-10 RX ADMIN — LIDOCAINE HYDROCHLORIDE 60 MG: 20 INJECTION, SOLUTION INTRAVENOUS at 10:55

## 2023-02-10 RX ADMIN — PHENYLEPHRINE HYDROCHLORIDE 200 MCG: 10 INJECTION INTRAVENOUS at 11:35

## 2023-02-10 RX ADMIN — CEFAZOLIN 3000 MG: 10 INJECTION, POWDER, FOR SOLUTION INTRAVENOUS; PARENTERAL at 11:04

## 2023-02-10 RX ADMIN — BUPIVACAINE HYDROCHLORIDE 20 ML: 5 INJECTION, SOLUTION EPIDURAL; INTRACAUDAL; PERINEURAL at 09:00

## 2023-02-10 RX ADMIN — SUGAMMADEX 200 MG: 100 INJECTION, SOLUTION INTRAVENOUS at 12:15

## 2023-02-10 RX ADMIN — BUPIVACAINE 10 ML: 13.3 INJECTION, SUSPENSION, LIPOSOMAL INFILTRATION at 09:00

## 2023-02-10 RX ADMIN — ROCURONIUM BROMIDE 30 MG: 10 INJECTION INTRAVENOUS at 10:55

## 2023-02-10 RX ADMIN — MIDAZOLAM HYDROCHLORIDE 2 MG: 2 INJECTION, SOLUTION INTRAMUSCULAR; INTRAVENOUS at 09:00

## 2023-02-10 RX ADMIN — SODIUM CHLORIDE, SODIUM LACTATE, POTASSIUM CHLORIDE, AND CALCIUM CHLORIDE: .6; .31; .03; .02 INJECTION, SOLUTION INTRAVENOUS at 10:48

## 2023-02-10 ASSESSMENT — PAIN DESCRIPTION - DESCRIPTORS
DESCRIPTORS: ACHING;BURNING
DESCRIPTORS: NUMBNESS

## 2023-02-10 ASSESSMENT — PAIN SCALES - GENERAL
PAINLEVEL_OUTOF10: 4
PAINLEVEL_OUTOF10: 0

## 2023-02-10 ASSESSMENT — PAIN - FUNCTIONAL ASSESSMENT: PAIN_FUNCTIONAL_ASSESSMENT: 0-10

## 2023-02-10 ASSESSMENT — PAIN DESCRIPTION - ORIENTATION: ORIENTATION: RIGHT

## 2023-02-10 ASSESSMENT — PAIN DESCRIPTION - FREQUENCY: FREQUENCY: CONTINUOUS

## 2023-02-10 ASSESSMENT — PAIN DESCRIPTION - PAIN TYPE: TYPE: ACUTE PAIN

## 2023-02-10 ASSESSMENT — PAIN DESCRIPTION - LOCATION: LOCATION: FINGER (COMMENT WHICH ONE)

## 2023-02-10 ASSESSMENT — LIFESTYLE VARIABLES: SMOKING_STATUS: 0

## 2023-02-10 NOTE — PROGRESS NOTES
Continues to report pain in baby finger which improves with getting pressure of immobilizer off. Denies need for pain med. Taking po well.

## 2023-02-10 NOTE — ANESTHESIA PROCEDURE NOTES
Peripheral Block    Patient location during procedure: pre-op  Reason for block: procedure for pain, post-op pain management and at surgeon's request  Start time: 2/10/2023 9:15 AM  Staffing  Performed: anesthesiologist   Anesthesiologist: Pari Lema DO  Preanesthetic Checklist  Completed: patient identified, IV checked, site marked, risks and benefits discussed, surgical/procedural consents, equipment checked, pre-op evaluation, timeout performed, anesthesia consent given, oxygen available, monitors applied/VS acknowledged, fire risk safety assessment completed and verbalized and blood product R/B/A discussed and consented  Peripheral Block   Patient position: supine  Prep: ChloraPrep  Patient monitoring: cardiac monitor, continuous pulse ox, continuous capnometry, frequent blood pressure checks, IV access and oxygen  Block type: Brachial plexus  Interscalene  Laterality: right  Injection technique: single-shot  Guidance: ultrasound guided    Needle   Needle gauge: 22 G  Needle localization: anatomical landmarks and ultrasound guidance  Assessment   Injection assessment: negative aspiration for heme, no paresthesia on injection, local visualized surrounding nerve on ultrasound and no intravascular symptoms  Paresthesia pain: none  Slow fractionated injection: yes  Hemodynamics: stable  Real-time US image taken/store: yes  Outcomes: uncomplicated and patient tolerated procedure well    Additional Notes  Sterile prep. Timeout with SDS RN. 2 mg versed + 100 mcg fentanyl IV for pt comfort. 20 mL 0.5% Bupivacaine + 10 mL exparel injected in 5 mL increments following negative aspiration. Tip of needle in view at all times. No pain or paresthesias on injection. Pt tolerated the procedure well.

## 2023-02-10 NOTE — DISCHARGE INSTRUCTIONS
Dr. Stan Morgan Discharge Instructions    Take pain medication as directed. If taking narcotic pain medication, do not take tylenol with this as there is tylenol in percocet/norco. Do not operate machinery while taking narcotic pain medications  Non weight bearing to effected extremity  Maintain sling until follow up or therapy. OK to remove sling several times a day to move elbow and wrist, no shoulder motion unless directed by therapy or Dr. Sabrina Keene. Therapy as instructed. You should receive a call regarding therapy  Resume regular diet  May take down dressing in 72 hours and replace with a clean dressing  May shower after 72 hours. Avoid any submersion of operative extremity. Avoid any hot tubs, tub baths, pools, lakes, ocean ect. Avoid contact with dishwater or dirty water. Call immediately if any increasing redness or drainage, any fevers. Colace for constipation  zofran for nausea  Aspirin 325mg 2 times daily for next 14 days to decrease risk of blood clots    Dr. Faye Cortez and Mariia  Call [phone] for appointment       PERIPHERAL NERVE BLOCK INSTRUCTIONS     Please remember while having a nerve block you are at an increased risk for [address] were given a nerve block today from the anesthesiologist. Most nerve blocks last anywhere from 6-36 hours. You should start taking your pain medication before the block wears off or when you first begin feeling discomfort. It takes at least 30-60 minutes for a pain pill to take effect. Pain medications should be taken with food. Consider setting an alarm through the night to help manage your pain level so you do not wake up with too much pain. Pain medicines can cause more sedation and decrease your breathing so ONLY take as directed. If you have Sleep Apnea, you need to use your C-Pap machine.    What to expect after a nerve block:    Numbness, tingling- affected area feels heavy or asleep   Weakness or inability to move or control your affected area   Inability to feel temperature changes to your affected area  Usually the weakness wears off first, followed by the tingling or heaviness. You may notice more pain at this point and should start taking your pain meds. If you had a shoulder block, you may experience:   Mild shortness of breath (may be relieved by sitting up in a chair or recliner)   Hoarse voice   Blurry vision   Unequal pupils   Drooping of your face (eye or lip) on the same side as the nerve block   Swelling at the injection site on the side of your neck  These side effects should resolve as the block wears off   IF you have severe or prolonged shortness of breath- GO to the nearest Emergency Room  The nerve block may effect your arm:   Protect the arm from extreme hot or cold temperatures   Protect the arm from obstructing blood flow by frequent position changes- Make sure fingers/ toes stay pink and warm. Call surgeon with any changes   If you continue to feel the effects of the nerve block for longer than 120 hours- call [facility] at [phone] and ask to speak with the Anesthesiologist on call. Your Surgeon or Anesthesiologist gave you Exparel     Exparel (bupivicaine liposome injectable suspension) is a medication injected into the surgical incision  just before the end of the procedure by your surgeon to help control your pain after surgery. It can also be given as a nerve block by the anesthesiologist. This local anesthetic provides pain relief by numbing the tissue around the surgical site. Exparel releases pain medication over time lasting up to 5 days or 120 hours. Exparel may cause a temporary loss of sensation or the ability to move in the area where the medication was injected. Side effects of Exparel that may occur include nausea, vomiting or constipation. Call immediately if you experience numbness or tingling of the mouth or lips, lightheadedness or severe anxiety. Notify your physician if you experience these or any other possible side effects of the medication. Note: Other forms of bupivacaine should not be administered within 96 hours (4 days) following administration of Exparel. Please keep ID band in place for 96 hours (4 days). [address]    There are potential side effects of anesthesia or sedation you may experience for the first 24 hours. These side effects include:    Confusion or Memory loss, Dizziness, or Delayed Reaction Times   [x]A responsible person should be with you for the next 24 hours. Do not operate any vehicles (automobiles, bicycles, motorcycles) or power tools or machinery for 24 hours. Do not sign any legal documents or make any legal decisions for 24 hours. Do not drink alcohol for 24 hours or while taking narcotic pain medication. Nausea    [x]Start with light diet and progress to your normal diet as you feel like eating. However, if you experience nausea or repeated episodes of vomiting which persist beyond 12-24 hours, notify your physician. Once nausea has passed, remember to keep drinking fluids. Difficulty Passing Urine  [x]Drink extra amounts of fluid today. Notify your physician if you have not urinated within 8 hours after your procedure or you feel uncomfortable. Irritated Throat from a Breathing Tube  [x]Drink extra amounts of fluid today. Lozenges may help. Muscle Aches  [x]You may experience some generalized body aches as your muscles recover from medications used to relax them during surgery. These will gradually subside. MEDICATION INSTRUCTIONS:  []Prescription(S) x     sent with you. Use as directed. When taking pain medications, you may experience the side effect of dizziness or drowsiness. Do not drink alcohol or drive when taking these medications.   [x]Prescription(S) x   5       Called to Pharmacy     [x]Give the list of your medications to your primary care physician on your next visit. Keep your med list updated and carry it with in case of emergencies. [x] Narcotic pain medications can cause the side effect of significant constipation. You may want to add a stool softener to your postoperative medication schedule or speak to your surgeon on how best to manage this side effect. NARCOTIC SAFETY:  Your pain medicine is only for you to take. Safely store your medicines. Store pills up high and out of reach of children and pets. Ensure safety caps are snapped tightly  Keep track of how many pills you have left    Unused medication can be disposed of by taking them to a drop-off box or take-back program that is authorized by the [facility]. Access to a site near you can be found on the [facility] [employer] [employer] [facility] website ([address]. [employer]). If you have a CPAP machine, it is very important that you use it daily during all periods of sleep and daytime rest during your recovery at home. Surgery and Anesthesia place a significant amount of stress on your body. Using your CPAP will help keep you safe and lessen the negative effects of that stress. FOLLOW-UP RECOVERY CARE:  [x]Call the office at [phone] for follow-up appointment and problems    Watch for these possible complications, symptoms, or side effects of anesthesia. Call physician if they or any other problems occur:  Signs of INFECTION   > Fever over 101°     > Redness, swelling, hardness or warmth at the operative site   >Foul smelling or cloudy drainage at the operative site   Unrelieved PAIN  Unrelieved NAUSEA  Blood soaked dressing. (Some oozing may be normal)  Inability to urinate      Numb, pale, blue, cold or tingling extremity      Physician:  Dr Krystal Walker    The above instructions were reviewed with patient/significant other.   The following additional patient specific information was reviewed with the patient/significant other:  [x]Procedure/physician specific instructions  []Medication information sheet(S) including potential side effects  []Darielas egress test  []Pain Ball management  []FAQ Catheter associated blood stream infections  [x]FAQ Surgical Site Infections  [x]Other- information on nerve block and Exparel    I have read and understand the instructions given to me: ____________________________________________   (Patient/S.O. Signature)            Date/time 2/10/2023 12:29 PM         PACU:  [phone]   M-F 700 AM - 7 PM      SAME DAY SERVICES:  [phone] M-F 7AM-6PM        If you smoke STOP. We care about your health!

## 2023-02-10 NOTE — PROGRESS NOTES
Ambulatory Surgery/Procedure Discharge Note    Vitals:    02/10/23 1359   BP: 123/80   Pulse: 78   Resp: 16   Temp: 97 °F (36.1 °C)   SpO2: 91%       In: 490 [P.O.:240; I.V.:150]  Out: 300 [Urine:300]    Restroom use offered before discharge. Yes    Pain assessment:  level of pain (1-10, 10 severe),   Pain Level: 0  Pt reports no pain at surgical site but states discomfort of pinky finger of right hand that is in the sling. Adjusted position of hand in the sling and rolled sling back 1 inch to allow part of hand out of the sling. Pt reports relief. Pt and S.O./family states \"ready to go home\". Pt alert and oriented x4. IV removed. Denies N/V or pain. Dressing C, D & I.  Voided prior to discharge. Discharge instructions given to pt and wife with pt permission. Pt and wife verbalized understanding of all instructions. Left with all belongings, prescriptions, and discharge instructions. Prescriptions sent to [facility]. Wife called to verify prescriptions were received and would be able to be picked up this evening and it was confirmed by the pharmacy. Patient discharged to home/self care.  Patient discharged via wheel chair by transporter to waiting family/S.O.       2/10/2023 2:33 PM

## 2023-02-10 NOTE — PROGRESS NOTES
1236 Admitted to PACU from OR. Connected to monitor. Report at bedside. Denies pain in shoulder. Reports pain in little finger/hand - pressure from sling - repositioned. No nausea.

## 2023-02-10 NOTE — ANESTHESIA POSTPROCEDURE EVALUATION
Department of Anesthesiology  Postprocedure Note    Patient: Nereida Rogers  MRN: [id number]  YOB: 1965  Date of evaluation: 2/10/2023      Procedure Summary     Date: 02/10/23 Room / Location: [address]    Anesthesia Start: 7949 Anesthesia Stop: 0960    Procedure: RIGHT SHOULDER DIAGNOSTIC ARTHROSCOPY, INSERTION OF SUBACROMIAL BALLOON SPACER, EXTENSIVE DEBRIDEMENT,LYSIS OF ADHESIONS, SUBACROMIAL DECOMPRESSION WITH TUBEROPLASTY (Right) Diagnosis:       Right shoulder pain, unspecified chronicity      (Right shoulder pain, unspecified chronicity [M25.511])    Surgeons: Michael Cole MD Responsible Provider: Stephanie Choudhary DO    Anesthesia Type: general, regional ASA Status: 3          Anesthesia Type: No value filed.     Abbe Phase I: Abbe Score: 10    Abbe Phase II: Abbe Score: 10      Anesthesia Post Evaluation    Patient location during evaluation: PACU  Patient participation: complete - patient participated  Level of consciousness: awake  Pain score: 0  Nausea & Vomiting: no nausea and no vomiting  Complications: no  Cardiovascular status: blood pressure returned to baseline  Respiratory status: acceptable  Hydration status: euvolemic

## 2023-02-10 NOTE — ANESTHESIA PRE PROCEDURE
Department of Anesthesiology  Preprocedure Note       Name:  Landy Love   Age:  59 y.o.  :  1965                                          MRN:  [id number]         Date:  2/10/2023      Surgeon: Shaun Patterson):  Leonor Hernandez MD    Procedure: Procedure(s):  RIGHT SHOULDER ARTHROSCOPY, INSERTION OF SUBACROMIAL BALLOON SPACER, DEBRIDEMENT    Medications prior to admission:   Prior to Admission medications    Medication Sig Start Date End Date Taking? Authorizing Provider   meloxicam (MOBIC) 15 MG tablet Take 1 tablet by mouth daily as needed for Pain 22   Cisco Mcghee MD   tadalafil (CIALIS) 10 MG tablet 10 mg 10/4/22   Historical Provider, MD   prednisoLONE acetate (PRED FORTE) 1 % ophthalmic suspension INSTILL 1 DROP TO AFFECTED EYE(S) THREE TIMES A DAY IN SURGERY EYE AFTER SURGERY AND TAPER AS DIRECTED 3/3/22   Historical Provider, MD   moxifloxacin (VIGAMOX) 0.5 % ophthalmic solution INSTILL 1 DROP TO AFFECTED EYE(S) THREE TIMES A DAY INTO OPERATIVE EYE 1 DAY BEFORE SURGERY AND 1 DROP THE MORNING OF SURGERY.  CONTINUE THREE TIMES A DAY FOR 1 WEEK. 3/3/22   Historical Provider, MD   metFORMIN (GLUCOPHAGE) 1000 MG tablet 1,000 mg 22   Historical Provider, MD   loratadine (CLARITIN) 10 MG tablet 10 mg 22   Historical Provider, MD   levothyroxine (SYNTHROID) 150 MCG tablet 150 mcg 3/8/22   Historical Provider, MD   ketorolac (ACULAR) 0.5 % ophthalmic solution INSTILL 1 DROP TO AFFECTED EYE(S) ONCE DAILY IN OPERATIVE EYE THE DAY BEFORE SURGERY AND MORNING OF, TAPER AS DIRECTED FOR 4 WEEKS 3/3/22   Historical Provider, MD   insulin glargine (LANTUS) 100 UNIT/ML injection vial INJECT 70 UNITS UNDER THE SKIN AT BEDTIME * DO NOT MIX IN SAME SYRINGE WITH OTHER INSULINS * (DISCARD 28 DAYS AFTER OPENING VIAL) 2/10/22   Historical Provider, MD   fluticasone (FLONASE) 50 MCG/ACT nasal spray USE 2 SPRAYS IN EACH NOSTRIL ONCE DAILY FOR NASAL ALLERGY **NOTE NEW DOSE/DIRECTIONS** 22   Historical Provider, MD   empagliflozin (JARDIANCE) 25 MG tablet 12.5 mg 10/4/22   Historical Provider, MD   dulaglutide (TRULICITY) 1.5 GJ/5.7BD SC injection INJECT 1.5MG (0.5ML) UNDER THE SKIN EVERY WEEK FOR BLOOD SUGAR 8/3/22   Historical Provider, MD   atorvastatin (LIPITOR) 80 MG tablet 80 mg 7/22/22   Historical Provider, MD   aspirin 81 MG EC tablet 81 mg 10/4/22   Historical Provider, MD       Current medications:    Current Facility-Administered Medications   Medication Dose Route Frequency Provider Last Rate Last Admin    ceFAZolin (ANCEF) 3,000 mg in sodium chloride 0.9 % 100 mL IVPB  3,000 mg IntraVENous Once Charles Martinez MD        lidocaine PF 1 % injection 1 mL  1 mL IntraDERmal Once PRN Bello Rodriguez, DO        lactated ringers IV soln infusion   IntraVENous Continuous Bello Rodriguez, DO        midazolam (VERSED) 2 MG/2ML injection             fentaNYL (SUBLIMAZE) 100 MCG/2ML injection             bupivacaine (PF) (MARCAINE) 0.5 % injection                Allergies:  No Known Allergies    Problem List:  There is no problem list on file for this patient.       Past Medical History:        Diagnosis Date    Diabetes mellitus ([facility]     Hypertension        Past Surgical History:        Procedure Laterality Date    KNEE ARTHROSCOPY      right       Social History:    Social History     Tobacco Use    Smoking status: Never    Smokeless tobacco: Never   Substance Use Topics    Alcohol use: Not Currently                                Counseling given: Not Answered      Vital Signs (Current):   Vitals:    02/10/23 0800   BP: (!) 147/91   Pulse: 81   Resp: 14   Temp: 96.9 °F (36.1 °C)   TempSrc: Temporal   SpO2: 96%   Weight: 256 lb 3.2 oz (116.2 kg)   Height: 5' 11\" (1.803 m)                                              BP Readings from Last 3 Encounters:   02/10/23 (!) 147/91       NPO Status: Time of last liquid consumption: 2100                        Time of last solid consumption: 2100 Date of last liquid consumption: 02/09/23                        Date of last solid food consumption: 02/09/23    BMI:   Wt Readings from Last 3 Encounters:   02/10/23 256 lb 3.2 oz (116.2 kg)   01/26/23 260 lb (117.9 kg)   12/08/22 260 lb (117.9 kg)     Body mass index is 35.73 kg/m². CBC: No results found for: WBC, RBC, HGB, HCT, MCV, RDW, PLT    CMP: No results found for: NA, K, CL, CO2, BUN, CREATININE, GFRAA, AGRATIO, LABGLOM, GLUCOSE, GLU, PROT, CALCIUM, BILITOT, ALKPHOS, AST, ALT    POC Tests:   Recent Labs     02/10/23  0815   POCGLU 168*       Coags: No results found for: PROTIME, INR, APTT    HCG (If Applicable): No results found for: PREGTESTUR, PREGSERUM, HCG, HCGQUANT     ABGs: No results found for: PHART, PO2ART, BES8CHV, KNT7QAD, BEART, Q4QOQQWT     Type & Screen (If Applicable):  No results found for: LABABO, LABRH    Drug/Infectious Status (If Applicable):  No results found for: HIV, HEPCAB    COVID-19 Screening (If Applicable): No results found for: COVID19        Anesthesia Evaluation  Patient summary reviewed and Nursing notes reviewed no history of anesthetic complications:   Airway: Mallampati: II  TM distance: >3 FB   Neck ROM: full  Mouth opening: > = 3 FB   Dental:          Pulmonary: breath sounds clear to auscultation      (-) not a current smoker                           Cardiovascular:  Exercise tolerance: good (>4 METS),   (+) hypertension:,         Rhythm: regular  Rate: normal                 ROS comment: Denies CP/SOB     Neuro/Psych:               GI/Hepatic/Renal:        (-) no morbid obesity       Endo/Other:    (+) DiabetesType II DM, , hypothyroidism::., .                 Abdominal:             Vascular: Other Findings:           Anesthesia Plan      general and regional     ASA 3     (Interscalene nerve block PSR)  Induction: intravenous. MIPS: Prophylactic antiemetics administered. Anesthetic plan and risks discussed with patient.       Plan discussed with CRNA.                     Marsha Love, DO   2/10/2023

## 2023-02-13 NOTE — OP NOTE
[facility]                [address]                                OPERATIVE REPORT    PATIENT NAME: Merna Gregg                   :        1965  MED REC NO:   [id number]                          ROOM:  ACCOUNT NO:   [de-identified]                           ADMIT DATE: 02/10/2023  PROVIDER:     Lesly Rodriguez MD    DATE OF PROCEDURE:  02/10/2023    PREOPERATIVE DIAGNOSIS:  Massive irreparable rotator cuff tear, right  shoulder. POSTOPERATIVE DIAGNOSES:  Massive irreparable rotator cuff tear, right  shoulder with extensive scarring, residual impingement and chronic  biceps tendon tear. PROCEDURE PERFORMED:  Right shoulder examination under anesthesia,  diagnostic arthroscopy, arthroscopic extensive debridement of rotator  cuff, biceps stump, rotator interval, subacromial bursa, arthroscopic  lysis of subdeltoid, subacromial adhesions, arthroscopic subacromial   decompression with greater tuberosity, tuberoplasty - burring down  the greater tuberosity to address impingement, insertion of subacromial  balloon spacer. ANESTHESIA:  General anesthesia, interscalene block. IV FLUIDS:  700 mL crystalloids. ESTIMATED BLOOD LOSS:  25 mL. COMPLICATIONS:  None. SURGEON:  Lesly Rodriguez MD    ASSISTANTS:  Marina Guevara MD; and and Britton Alas DO.    IMPLANT:  Flash InSpace subacromial balloon spacer size large x1 with  26 mm of saline. FINDINGS:  Examination under anesthesia revealed no focal motion  deficits. There was some subacromial crepitus. Diagnostic arthroscopy  revealed massive rotator cuff tear. No significant arthritis. Subscapularis intact. Biceps was chronically torn. There was some  rotator interval scarring and stump. There was some biceps stump on the  labrum, there was some debridement of rotator interval scar amenable to  debridement.   Extensive scar between the rotator cuff and the labrum and  rotator cuff and the undersurface of the acromion. These were all  debrided. There was proper indication for subacromial space and this  was inserted without difficulty with humeral head centering at the  glenoid at the conclusion of procedure. BRIEF HISTORY AND PRESENTING ILLNESS:  As follows:  A 61-year-old  gentleman who was referred to see me for massive rotator cuff tear. Tendon was very short and the muscle had atrophy, moderate release. I  recommended subacromial balloon spacer. It was felt that the  subscapularis was intact. He had negative external rotation lag sign. He has tried active range of motion. He was counseled about risk and  benefits and alternatives and wished to proceed with surgery. He  understood the risks such as bleeding, infection, anesthetic risks,  injury to nerve and blood vessels, stiffness, weakness, incomplete pain  relief, and need for further surgery. He was scheduled on an elective  basis after appropriate preoperative medical clearance. OPERATIVE PROCEDURE:  On the date of procedure, the patient was brought  back to the operating room, placed supine on the operating table. General anesthesia was established. Preoperative antibiotics and  interscalene block were given in the holding area. Under anesthesia,  exam was carried out with the findings as noted above. The patient was  positioned using a [employer]en[employer] beach-chair positioner in a sitting position. All pressure points were padded. The patient's right shoulder and arm  were prepped and draped in a standard manner for shoulder surgery. We  used Simpler Spider arm reynoso for arm position. We began diagnostic  arthroscopy. The arthroscope was introduced into the glenohumeral joint  through the same posterior portal.  Systematic diagnostic arthroscopy  was ensued with findings as noted above.   We established an anterior  portal within the rotator interval.  We used arthroscopic shaver across  the metal cannula. We debrided the biceps stump. We debrided the  rotator interval.  We opened it up with cautery down to the base of  coracoid. We identified the subscapularis and was intact. We could see  the massive posterior-inferior rotator cuff. There were some adhesions. These were resected with cautery. We repositioned the arthroscope  through same posterior portal above the rotator cuff remnants and  subacromial space. We established a lateral portal under direct  visualization and dilated the portal using an arthroscopic shaver and  performed a thorough bursectomy. We removed down the articular side of  the rotator cuff. We exposed the lateral end of the bone. We used an  acromionizer bacilio from lateral to bacilio down the greater tuberosity. We  did a tuberoplasty and smoothed out any excrescences. We could see that   the tear was massive and retracted. We placed arthroscope laterally and   completed bursectomy. We released the adhesions with shaver and cautery   device from posterior. We  the scar and deltoid adhesions  from the bursal side of the rotator cuff. We could see the teres minor  was intact. We released adhesions and then underneath the medial aspect  of acromion, we  the rotator cuff and depressed it so we could  remove some of the scar at the undersurface of the acromion. We created  a pocket medially at least 1 cm from the glenoid edge. I opened it up  with cautery. We fine tuned the tuberoplasty. We removed all the   debris from the undersurface of the acromion. We did not do formal  acromioplasty. We then with the arthroscope posteriorly, we brought in  the calibrated drill from lateral and measured just over 50 mm. We knew  we could size it large. The  was deployed from lateral portal  after enlarging it. We removed the sheath and filled up the syringe  with 40 mL of fluid. We could see the balloon expander and we removed  14 mL for total of 26 mL. We ironed out any of the kinks in the  balloon. We checked range of motion to make sure there was no abutment  or extrusion. We then deployed the sealing mechanism and removed the  . The balloon stayed in a stable position as we rotated the  shoulder. This completed our procedure. We closed the arthroscopic  portals, anterolateral and posterior with 4-0 Monocryl closure and  Prineo dressing. Sterile compressive dressing applied. The arm was  placed in a padded soft brace. The patient was repositioned in supine  position before this and promptly awakened from anesthesia, having  tolerated the procedure well, and was taken from the operating room to  the recovery room in satisfactory condition. PLAN:  The patient will be discharged on oral analgesics with  instructions to keep the arm in brace and delayed rehab for a couple of  weeks. Start general range of motion including adequate level of  flexion. He will then advance the therapy for several months while he  continues to ramp up to recover as much motion and function as possible.       Jolie Hernández MD    D: 02/10/2023 13:31:13       T: 02/10/2023 20:28:23     [email or web address]  Job#: [id number]     Doc#: [id number]

## 2023-02-16 ENCOUNTER — OFFICE VISIT (OUTPATIENT)
Dept: ORTHOPEDIC SURGERY | Age: 58
End: 2023-02-16

## 2023-02-16 VITALS — HEIGHT: 71 IN | BODY MASS INDEX: 35.84 KG/M2 | WEIGHT: 256 LBS

## 2023-02-16 DIAGNOSIS — Z98.890 S/P SHOULDER SURGERY: ICD-10-CM

## 2023-02-16 DIAGNOSIS — M25.511 RIGHT SHOULDER PAIN, UNSPECIFIED CHRONICITY: Primary | ICD-10-CM

## 2023-02-16 PROCEDURE — 99024 POSTOP FOLLOW-UP VISIT: CPT | Performed by: PHYSICIAN ASSISTANT

## 2023-02-16 NOTE — PROGRESS NOTES
History of Present Illness:  Gabby Foy is a 59 y.o. male who presents for a post operative visit. The patient underwent a right arthroscopic insertion of subacromial balloon spacer on 2/10/2023 by Dr. Maria Luisa Tan. Overall he is doing okay and feels that their pain is well controlled with current pain medications. He has been compliant with wearing the UltraSling brace at all times. The patient deny fevers, chills, numbness, tingling, and shortness of breath. Medical History:  Patient's medications, allergies, past medical, surgical, social and family histories were reviewed and updated as appropriate. No notes on file    Review of Systems  A 14 point review of systems was completed by the patient is available in the media section of the scanned medical record and was reviewed on 2/16/2023. Vital Signs: There were no vitals filed for this visit. General/Appearance: Alert and oriented and in no apparent distress. Skin:  There are no skin lesions, cellulitis, or extreme edema. The patient has warm and well-perfused Bilateral upper extremities with brisk capillary refill.      right Shoulder Exam:    Inspection: right shoulder incision that is clean, dry and intact and well approximated. The Prineo dressing is still in place. Mild ecchymosis and swelling are present as can be expected. There is no erythema, drainage or other signs of infection    Palpation:  No crepitus to gentle motion    Active Range of Motion: Deferred    Passive Range of Motion:  tolerates FE to 90 and ER to 20    Strength:  Deferred    Special Tests:  Deferred. Neurovascular: Sensation to light touch is intact, no motor deficits, palpable radial pulses 2+    Radiology:     Plain radiographs not obtained. .        Assessment :  Mr. Gabby Foy is a 59 y.o. patient underwent a right arthroscopic insertion of subacromial balloon spacer on 2/10/2023        Impression:  Encounter Diagnoses   Name Primary?     Right shoulder pain, unspecified chronicity Yes    S/P shoulder surgery        Office Procedures:  No orders of the defined types were placed in this encounter. Treatment Plan:    Overall the patient is doing well. The pain is well-controlled. We recommend that he wear the UltraSling brace at all times with the exception of clothing, bathing of physical therapy. The patient was told that he is restricted from driving for at least 3 weeks postop. We will give a print out of their physical therapy order. All of his questions were fully answered today. We would like to see him back in 2 weeks for follow-up visit. 1:35 PM    Jaun Dainel PA-C  Orthopaedic Sports Medicine Physician Assistant      This dictation was performed with a verbal recognition program [facility]) and it was checked for errors. It is possible that there are still dictated errors within this office note. If so, please bring any errors to my attention for an addendum. All efforts were made to ensure that this office note is accurate.

## 2023-02-20 ENCOUNTER — TELEPHONE (OUTPATIENT)
Dept: ORTHOPEDIC SURGERY | Age: 58
End: 2023-02-20

## 2023-02-21 DIAGNOSIS — Z98.890 S/P SHOULDER SURGERY: ICD-10-CM

## 2023-02-21 RX ORDER — OXYCODONE HYDROCHLORIDE AND ACETAMINOPHEN 5; 325 MG/1; MG/1
1 TABLET ORAL EVERY 6 HOURS PRN
Qty: 28 TABLET | Refills: 0 | Status: SHIPPED | OUTPATIENT
Start: 2023-02-21 | End: 2023-02-28

## 2023-03-02 ENCOUNTER — OFFICE VISIT (OUTPATIENT)
Dept: ORTHOPEDIC SURGERY | Age: 58
End: 2023-03-02

## 2023-03-02 VITALS — WEIGHT: 256 LBS | BODY MASS INDEX: 35.84 KG/M2 | HEIGHT: 71 IN

## 2023-03-02 DIAGNOSIS — Z98.890 S/P SHOULDER SURGERY: Primary | ICD-10-CM

## 2023-03-02 NOTE — LETTER
Physical Therapy Rehabilitation Referral    Patient Name:  Sarthak Hughes      YOB: 1965    Diagnosis:    1. S/P shoulder surgery        Precautions:     [x] Evaluate and Treat    Post Op Instructions:  [] Continuous passive motion (CPM) [x] Elbow ROM  [x] Exercise in plane of scapula  [x]  Strengthening     [x] Pulley and instruction   [x] Home exercise program (copy to patient)   [] Sling when arm at risk  [] Sling or brace at all times   [x] AAROM: Forward elevation to  140            [x] AAROM: External rotation  To  40    [] Isometric external rotator strengthening [x] AAROM: internal rotation: up the back  [x] Isometric abductor strengthening  [x] AAROM: Internal abduction   [] Isometric internal rotator strengthening [x] AAROM: cross-body adduction             Stretching:     Strengthening:  [x] Four quadrant (FE, ER, IR, CBA)  [] Rotator cuff (ER, IR, Abd)  [] Forward Elevation    [] External Rotators     [] External Rotation    [] Internal Rotators  [] Internal Rotation: up/back   [] Abductors     [] Internal Rotation: supine in abduction  [] Sleeper Stretch    [] Flexors  [] Cross-body abduction    [] Extensors  [] Pendulum (FE, Abd/Add, cw/ccw)  [x] Scapular Stabilizers   [] Wall-walking (FE, Abd)        [x] Shoulder shrugs     [] Table slides (FE)                [x] Rhomboid pinch  [] Elbow (flex, ext, pron, sup)        [] Lat.  Pull downs     [] Medial epicondylitis program       [] Forward punch   [] Lateral epicondylitis program       [] Internal rotators     [] Progressive resistive exercises  [] Bench Press        [] Bench press plus  Activities:     [] Lateral pull-downs  [] Rowing     [] Progressive two-hand supine press  [] Stepper/Exercise bike   [] Biceps: curls/supination  [] Swimming  [] Water exercises    Modalities:     Return to Sport:  [x] Of Choice      [] Plyometrics  [] Ultrasound     [] Rhythmic stabilization  [] Iontophoresis    [] Core strengthening   [] Moist heat     [] Sports specific program:   [] Massage         [x] Cryotherapy      [] Electrical stimulation     [] Paraffin  [] Whirlpool  [] TENS    [x] Home exercise program (copy to patient). Perform exercises for:   15     minutes    3      times/day  [x] Supervised physical therapy  Frequency: []  1x week  [x] 2x week  [] 3x week  [] Other:   Duration: [] 2 weeks   [] 4 weeks  [x] 6 weeks  [] Other:     Additional Instructions: Please do progressive incline deltoid strengthening     Evelio Ching MD, PhD

## 2023-03-05 NOTE — PROGRESS NOTES
History of Present Illness:  Les Joshi returns for follow-up of his right shoulder. He is 3 weeks out following arthroscopic insertion of subacromial balloon spacer. He has been doing well but going stir crazy at home. He wants to return back to work but there is no light duty available. He is accompanied by his wife today. Overall, he is pleased at the low levels of pain. Medical History:  Patient's medications, allergies, past medical, surgical, social and family histories were reviewed and updated as appropriate. Pertinent items are noted in HPI  Review of systems reviewed from Patient History Form dated on 2/10/23 and available in the patient's chart under the Media tab. Vital Signs: There were no vitals filed for this visit. Constitutional: in no distress. Right Shoulder Examination:    Inspection:  [address] [place]. No deformity. Palpation:  no crepitus. Active Range of Motion: FE to 70 with pain. ER to 20     Passive Range of Motion:   Passive FE to 130 soft end feel. Strength:  Negative drop arm. Negative ER lag. Special Tests:  NVI distally. Assessment :  Les Joshi is doing well after subacromial balloon spacer. Impression:  Encounter Diagnosis   Name Primary? S/P shoulder surgery Yes       Office Procedures:  Orders Placed This Encounter   Procedures    Amb External Referral To Physical Therapy     Referral Priority:   Routine     Referral Type:   Consult for Advice and Opinion     Referral Reason:   Patient Preference     Requested Specialty:   Orthopedic Physical Therapist     Number of Visits Requested:   1       Treatment Plan:  We did have to reset his expectations. He can stop wearing the brace and we can advance PT and allow active shoulder motion. But he still won't be able to resume physical work. It is important that he continue with PT. A new prescription for PT was provided today. Follow-up in 3-4 weeks.  He is in agreement with this plan and all of his questions were answered today. Evelio Rice MD, PhD  3/2/2023

## 2023-03-21 ENCOUNTER — TELEPHONE (OUTPATIENT)
Dept: ORTHOPEDIC SURGERY | Age: 58
End: 2023-03-21

## 2023-03-27 ENCOUNTER — TELEPHONE (OUTPATIENT)
Dept: ORTHOPEDIC SURGERY | Age: 58
End: 2023-03-27

## 2024-04-30 ENCOUNTER — TELEPHONE (OUTPATIENT)
Dept: ORTHOPEDIC SURGERY | Age: 59
End: 2024-04-30

## 2024-04-30 NOTE — TELEPHONE ENCOUNTER
General Question     Subject: POSTOP FOLLOW UP  Patient and /or Facility Request: [facility]  Contact Number: [phone]    PATIENT CALLED TO SPEAK WITH CLINICAL TO TOUCH BASE ON RT SHOULDER SX-RECOVERY    PLEASE ADVISE

## (undated) DEVICE — CANNULA ARTHSCP L7CM DIA6MM CONIC TIP THRD NONSHIELDED DISP

## (undated) DEVICE — BUR SHAVER 5 MMX13 CM 8 FLUT OVL FOR AGGRESSIVE BNE COOLCUT

## (undated) DEVICE — 3M™ IOBAN™ 2 ANTIMICROBIAL INCISE DRAPE 6640EZ: Brand: IOBAN™ 2

## (undated) DEVICE — TOWEL,STOP FLAG GOLD N-W: Brand: MEDLINE

## (undated) DEVICE — SUTURE PDS II SZ 0 L27IN ABSRB VLT L36MM CT-1 1/2 CIR Z340H

## (undated) DEVICE — PAD,NON-ADHERENT,3X8,STERILE,LF,1/PK: Brand: MEDLINE

## (undated) DEVICE — SYSTEM SKIN CLSR 22CM DERMBND PRINEO

## (undated) DEVICE — SPONGE GZ W4XL8IN COT WVN 12 PLY

## (undated) DEVICE — UNDERGLOVE SURG SZ 8 BLU LTX FREE SYN POLYISOPRENE POLYMER

## (undated) DEVICE — GLOVE ORANGE PI 8   MSG9080

## (undated) DEVICE — MAT FLR W32XL58IN

## (undated) DEVICE — SUTURE MCRYL SZ 4-0 L27IN ABSRB UD L19MM PS-2 1/2 CIR PRIM Y426H

## (undated) DEVICE — PROBE ABLAT XL 90DEG ASPIR BPLR RF 1 PC ELECTRD ERGO HNDL

## (undated) DEVICE — TUBING PMP L16FT MAIN DISP FOR AR-6400 AR-6475

## (undated) DEVICE — GOWN,REINFRCE,POLY,ECLIPSE,SLV,3XLG: Brand: MEDLINE

## (undated) DEVICE — SUTURE PDS II SZ 1 L27IN ABSRB VLT CT-1 L36MM 1/2 CIR Z341H

## (undated) DEVICE — GOWN,SIRUS,POLYRNF,BRTHSLV,XL,30/CS: Brand: MEDLINE

## (undated) DEVICE — SLING ARM XL L20.25IN D9IN COT POLY BILAT WEB SHLDR STRP

## (undated) DEVICE — SHOULDER ARTHROSCOPY: Brand: MEDLINE INDUSTRIES, INC.

## (undated) DEVICE — SOLUTION IV IRRIG LACTATED RINGERS 3000ML 2B7487

## (undated) DEVICE — PUDDLEVAC FLOOR SUCTION DEVICE: Brand: PUDDLEVAC

## (undated) DEVICE — BLADE SHV L13CM DIA5MM EXCALIBUR AGG COOLCUT

## (undated) DEVICE — STRAP POS W5XL72IN FOAM KNEE AND BODY HK LOOP CLSR DISP

## (undated) DEVICE — CANNULA ARTHSCP L7CM DIA7MM TRNSLUC THRD FLX W/ NO SQUIRT

## (undated) DEVICE — TUBING FLD MGMT Y DBL SPIK DUALWAVE

## (undated) DEVICE — SUTURE FIBERWIRE SZ 2 W/ TAPERED NEEDLE BLUE L38IN NONABSORB BLU L26.5MM 1/2 CIRCLE AR7200

## (undated) DEVICE — SUTURE VCRL SZ 3-0 L27IN ABSRB UD L26MM CT-2 1/2 CIR J232H

## (undated) DEVICE — TUBING PMP L6FT CONT WAVE EXTN

## (undated) DEVICE — YANKAUER,OPEN TIP,W/O VENT,STERILE: Brand: MEDLINE INDUSTRIES, INC.